# Patient Record
Sex: FEMALE | Race: BLACK OR AFRICAN AMERICAN | NOT HISPANIC OR LATINO | Employment: OTHER | URBAN - METROPOLITAN AREA
[De-identification: names, ages, dates, MRNs, and addresses within clinical notes are randomized per-mention and may not be internally consistent; named-entity substitution may affect disease eponyms.]

---

## 2018-04-25 PROBLEM — E55.9 VITAMIN D DEFICIENCY: Status: ACTIVE | Noted: 2018-04-25

## 2018-04-25 PROBLEM — E78.5 HYPERLIPIDEMIA: Status: ACTIVE | Noted: 2018-04-25

## 2018-04-25 PROBLEM — I10 ESSENTIAL HYPERTENSION, BENIGN: Status: ACTIVE | Noted: 2018-04-25

## 2018-04-25 PROBLEM — Z11.3 SCREENING EXAMINATION FOR VENEREAL DISEASE: Status: ACTIVE | Noted: 2018-04-25

## 2018-04-25 PROBLEM — R73.02 IGT (IMPAIRED GLUCOSE TOLERANCE): Status: ACTIVE | Noted: 2018-04-25

## 2018-04-25 PROBLEM — Z00.00 ANNUAL PHYSICAL EXAM: Status: ACTIVE | Noted: 2018-04-25

## 2018-04-25 PROBLEM — Z12.11 ENCOUNTER FOR SCREENING COLONOSCOPY: Status: ACTIVE | Noted: 2018-04-25

## 2018-04-25 PROBLEM — M25.511 ACUTE PAIN OF RIGHT SHOULDER: Status: ACTIVE | Noted: 2018-04-25

## 2018-04-27 ENCOUNTER — TELEPHONE (OUTPATIENT)
Dept: RADIOLOGY | Facility: HOSPITAL | Age: 63
End: 2018-04-27

## 2018-04-28 ENCOUNTER — HOSPITAL ENCOUNTER (OUTPATIENT)
Dept: RADIOLOGY | Facility: HOSPITAL | Age: 63
Discharge: HOME | End: 2018-04-28
Attending: INTERNAL MEDICINE
Payer: COMMERCIAL

## 2018-04-28 DIAGNOSIS — R93.89 ABNORMAL X-RAY: ICD-10-CM

## 2018-04-28 PROCEDURE — 73221 MRI JOINT UPR EXTREM W/O DYE: CPT | Mod: 26,RT,, | Performed by: RADIOLOGY

## 2018-04-28 PROCEDURE — 73221 MRI JOINT UPR EXTREM W/O DYE: CPT | Mod: TC,PO,RT

## 2018-05-10 ENCOUNTER — CLINICAL SUPPORT (OUTPATIENT)
Dept: REHABILITATION | Facility: HOSPITAL | Age: 63
End: 2018-05-10
Payer: COMMERCIAL

## 2018-05-10 DIAGNOSIS — M25.511 ACUTE PAIN OF RIGHT SHOULDER: Primary | ICD-10-CM

## 2018-05-10 PROCEDURE — 97014 ELECTRIC STIMULATION THERAPY: CPT | Mod: PO | Performed by: PHYSICAL THERAPIST

## 2018-05-10 PROCEDURE — 97110 THERAPEUTIC EXERCISES: CPT | Mod: PO | Performed by: PHYSICAL THERAPIST

## 2018-05-10 NOTE — PROGRESS NOTES
"    DATE OF INITIAL PHYSICAL THERAPY EVALUATION:             May 10, 2018      REFERRING PROVIDER:       Karine Peralta MD      REFERRING DIAGNOSIS:     Acute pain of right shoulder [M25.511]      ORDERS:   Evaluate and treat as indicated     VISIT    #1       SUBJECTIVE:      Patients primary complaint(s):  Right shoulder pain and stiffness with active elevation of the right UE against gravity  Impaired ADL's    History of present condition:    Patient complains of acute right shoulder pain and stiffness which has been present for approximately one month.  She reports her discomfort began after trying to push up from a right side lying position on the couch.  She states as she began to push herself up with her right UE, feel a "tearing" sensation over the top of the shoulder which was acutely painful.   Over the next few days her right shoulder became increasingly stiff and painful and she began to experience a decrease in functional ROM.  She complains of not being able to lift her right UE beyond 90 degrees flexion or abduction due to pain at the end point throughout the glenohumeral joint.  She reports she is pain free with her right shoulder in a neutral position and at rest.  There are no complaints of radiating discomfort into the right UE.      Pain Ratin/10 at rest      4/10 at the end point of active available range    Patient reports the following functional activity limitations:  Unable to reach overhead  Lifting and carrying impaired  ADL's impaired; sweeping, making the bed, etc    (FUNCTIONAL ASSESSMENT TOOL)    THE UPPER EXTREMITY FUNCTIONAL SCALE (UEFS) - The following scores are based on patient reported assessment at the time of the initial physical therapy evaluation:    0 = extreme difficulty or unable to perform activity; 1 = quite a bit of difficulty; 2 = moderate difficulty; 3 = a little bit of difficulty; 4 = no difficulty        Completed by patient on May 10, 37369    1 Any of " your usual work, housework, or school activities;   2  2 Your usual hobbies, re creational or sporting activities;   0  3 Lifting a bag of groceries to waist level;     3  4 Lifting a bag of groceries above your head;     0  5 Grooming your hair;        2  6 Pushing up on your hands (eg from bathtub or chair);   2  7 Preparing food (eg peeling, cutting);     2  8 Driving;         2  9 Vacuuming, sweeping or raking;      1  10 Dressing;         2  11 Doing up buttons;        4  12 Using tools or appliances;       4  13 Opening doors;        3  14 Cleaning;         2  15 Tying or lacing shoes;       4  16 Sleeping;         1  17 Laundering clothes (eg washing, ironing, folding);   2  18 Opening a jar;        2  19 Throwing a ball;        1  20 Carrying a small suitcase with your affected limb;   4  SCORE: 43/80    Patient reports 54% ability on the Upper Extremity Functional Scale.   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     Vitamin D deficiency      Patient Active Problem List   Diagnosis    Annual physical exam    Acute pain of right shoulder    Screening examination for venereal disease    Essential hypertension, benign    Vitamin D deficiency    Hyperlipidemia    IGT (impaired glucose tolerance)    Encounter for screening colonoscopy       Current Outpatient Prescriptions:     amLODIPine (NORVASC) 10 MG tablet, Take 1 tablet (10 mg total) by mouth once daily., Disp: 30 tablet, Rfl: 5    losartan-hydrochlorothiazide 50-12.5 mg (HYZAAR) 50-12.5 mg per tablet, TAKE 1 TABLET BY MOUTH DAILY, Disp: 30 tablet, Rfl: 0    olmesartan-hydrochlorothiazide (BENICAR HCT) 40-25 mg per tablet, TAKE 1 TABLET BY MOUTH DAILY. FOLLOW-UP APPOINTMENT., Disp: 30 tablet, Rfl: 5      Previous  physical therapy and treatments:  Patient has not participated in a physical therapy program to date for current referring diagnoses.    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    Swelling  No swelling noted in the UE's bilaterally    ROM of the right shoulder  Flexion  Active 90 degrees  Passive 150 degrees      End point of motion limited by complaints of pain    Extension WNL's  End point is uncomfortable    Abduction Active 90 degrees  Passive 90 degrees  End point of motion limited by complaints of pain    Adduction  WNL's    External rotation with shoulder at 30 degrees abduction: 45 degrees passive and active  End point is painful    Internal rotation with shoulder at 30 degrees abduction: 30 degrees passive and active  End point is painful    Patient has an acute positive impingement sign    Functional Strength Testing Right UE  Shoulder flexion 4+/5  Shoulder extension 4+/5  Shoulder adduction 5/5  Shoulder abduction 3+/5  Supraspinatus 3-/5  Infraspinatus 3-/5  Biceps 4+/5  Triceps 4+/5    Patient complained of pain with resistance to abduction, supraspinatus, and infraspinatus    Palpation  Right AC joint is tender  Tender over the insertion of the right rotator cuff  Tender in posterior deltoid    Neuromuscular Exam    Sensation  No sensory disturbances noted throughout the LE's  Patient did not complain of paresthesias.      PROBLEM LIST - ASSESSMENT  Patient presents with limited, painful ROM of the right shoulder, an acute positive impingement sign, and rotator cuff weakness.  Symptoms are impairing functional activities.  Patient would benefit from an out-patient physical therapy program to restore ROM, improve strength, and decrease joint and soft tissue pain.    Activity Limitations, Participation Restrictions, and CO-MORBIDITIES  which may impact the plan of care and potentially impede the patient's progress in therapy include:  none    The patient does not present with any learning or communication barriers which may impact the plan of care and potentially impede the patient's progress in therapy.      CLINICAL PRESENTATION:  Patient's Clinical Presentation is STABLE.      RECOMMENDED PLAN OF CARE:  Passive stretching exercises; joint mobilization to improve ROM of the right shoulder  Strengthening exercises to improve strength of the rotator cuff and scapular stabilizers    Modalities to decrease soft tissue tenderness and joint pain and stiffness      TREATMENT PROVIDED:       (one on one with therapist for therapeutic exercises 15 Mins)  Passive stretching of the right shoulder by therapist in all planes  Patient to continue with stretching exercises at home  --wand assisted shoulder flexion and external rotation in supine  --wand assisted shoulder extension and internal rotation in standing     She was instructed in the proper use of heat at home for symptomatic relief.      PLAN: Patient to attend out-patient physical therapy 2 x week to continue the Recommended Plan of Care.                                                                 Will add heat and electrical stimulation next visit; continue stretching exercises; begin mobility and strengthening exercises.        SHORT TERM GOALS:    1. Patient will report compliance with daily stretching exercises  2. Patient will report 2/10 or less pain level at end point of range   3. Active shoulder elevation will improve to 140 degrees or greater    LONG TERM GOALS:  1. Full, pain free ROM of the right shoulder  2. Strength of the rotator cuff of right shoulder will improve to 4+/5  3. Patient will report a 20% or greater decrease in functional impairment      These services are reasonable and necessary for the conditions set forth above while under my care.   '

## 2018-05-21 ENCOUNTER — CLINICAL SUPPORT (OUTPATIENT)
Dept: REHABILITATION | Facility: HOSPITAL | Age: 63
End: 2018-05-21
Payer: COMMERCIAL

## 2018-05-21 DIAGNOSIS — M25.511 ACUTE PAIN OF RIGHT SHOULDER: Primary | ICD-10-CM

## 2018-05-21 PROCEDURE — 97110 THERAPEUTIC EXERCISES: CPT | Mod: PO | Performed by: PHYSICAL THERAPIST

## 2018-05-21 PROCEDURE — 97014 ELECTRIC STIMULATION THERAPY: CPT | Mod: PO | Performed by: PHYSICAL THERAPIST

## 2018-05-22 NOTE — PROGRESS NOTES
"    DATE OF INITIAL PHYSICAL THERAPY EVALUATION:             May 10, 2018      REFERRING PROVIDER:       Karine Peralta MD      REFERRING DIAGNOSIS:     Acute pain of right shoulder [M25.511]      ORDERS:   Evaluate and treat as indicated     VISIT    #2      SUBJECTIVE:  Patient states she is slightly less symptomatic overall.  She reports working on the stretching exercises daily as prescribed.    Patients primary complaint(s):  Right shoulder pain and stiffness with active elevation of the right UE against gravity  Impaired ADL's    History of present condition provided by the patient during the initial evaluation:  Patient complains of acute right shoulder pain and stiffness which has been present for approximately one month.  She reports her discomfort began after trying to push up from a right side lying position on the couch.  She states as she began to push herself up with her right UE, feel a "tearing" sensation over the top of the shoulder which was acutely painful.   Over the next few days her right shoulder became increasingly stiff and painful and she began to experience a decrease in functional ROM.  She complains of not being able to lift her right UE beyond 90 degrees flexion or abduction due to pain at the end point throughout the glenohumeral joint.  She reports she is pain free with her right shoulder in a neutral position and at rest.  There are no complaints of radiating discomfort into the right UE.      Pain Ratin/10 at rest      4/10 at the end point of active available range    Patient reports the following functional activity limitations:  Unable to reach overhead  Lifting and carrying impaired  ADL's impaired; sweeping, making the bed, etc    (FUNCTIONAL ASSESSMENT TOOL)    THE UPPER EXTREMITY FUNCTIONAL SCALE (UEFS) - The following scores are based on patient reported assessment at the time of the initial physical therapy evaluation:    0 = extreme difficulty or unable to " perform activity; 1 = quite a bit of difficulty; 2 = moderate difficulty; 3 = a little bit of difficulty; 4 = no difficulty        Completed by patient on May 10, 79446    1 Any of your usual work, housework, or school activities;   2  2 Your usual hobbies, re creational or sporting activities;   0  3 Lifting a bag of groceries to waist level;     3  4 Lifting a bag of groceries above your head;     0  5 Grooming your hair;        2  6 Pushing up on your hands (eg from bathtub or chair);   2  7 Preparing food (eg peeling, cutting);     2  8 Driving;         2  9 Vacuuming, sweeping or raking;      1  10 Dressing;         2  11 Doing up buttons;        4  12 Using tools or appliances;       4  13 Opening doors;        3  14 Cleaning;         2  15 Tying or lacing shoes;       4  16 Sleeping;         1  17 Laundering clothes (eg washing, ironing, folding);   2  18 Opening a jar;        2  19 Throwing a ball;        1  20 Carrying a small suitcase with your affected limb;   4  SCORE: 43/80    Patient reports 54% ability on the Upper Extremity Functional Scale.   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     Vitamin D deficiency      Patient Active Problem List   Diagnosis    Annual physical exam    Acute pain of right shoulder    Screening examination for venereal disease    Essential hypertension, benign    Vitamin D deficiency    Hyperlipidemia    IGT (impaired glucose tolerance)    Encounter for screening colonoscopy       Current Outpatient Prescriptions:     amLODIPine (NORVASC) 10 MG tablet, Take 1 tablet (10 mg total) by mouth once daily., Disp: 30 tablet, Rfl: 5    losartan-hydrochlorothiazide 50-12.5 mg (HYZAAR) 50-12.5 mg per tablet, TAKE 1 TABLET BY MOUTH DAILY, Disp: 30  tablet, Rfl: 0    olmesartan-hydrochlorothiazide (BENICAR HCT) 40-25 mg per tablet, TAKE 1 TABLET BY MOUTH DAILY. FOLLOW-UP APPOINTMENT., Disp: 30 tablet, Rfl: 5      Previous physical therapy and treatments:  Patient has not participated in a physical therapy program to date for current referring diagnoses.    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    Swelling  No swelling noted in the UE's bilaterally    ROM of the right shoulder (initial)  Flexion  Active 90 degrees  Passive 150 degrees         Today:  Active flexion 100 degrees    Passive Flexion 160 degrees  End point of motion limited by complaints of pain    Extension WNL's  End point is uncomfortable    Abduction Active 90 degrees  Passive 90 degrees  End point of motion limited by complaints of pain    Adduction  WNL's    External rotation with shoulder at 30 degrees abduction: 45 degrees passive and active  End point is painful    Internal rotation with shoulder at 30 degrees abduction: 30 degrees passive and active  End point is painful    Patient has an acute positive impingement sign    Functional Strength Testing Right UE  Shoulder flexion 4+/5  Shoulder extension 4+/5  Shoulder adduction 5/5  Shoulder abduction 3+/5  Supraspinatus 3-/5  Infraspinatus 3-/5  Biceps 4+/5  Triceps 4+/5    Patient complained of pain with resistance to abduction, supraspinatus, and infraspinatus    Palpation  Right AC joint is tender  Tender over the insertion of the right rotator cuff  Tender in posterior deltoid  Tender over the long head of the biceps    Neuromuscular Exam    Sensation  No sensory disturbances noted throughout the LE's  Patient did not complain of paresthesias.      PROBLEM LIST - ASSESSMENT  Patient presents with limited, painful ROM of the right shoulder, an  acute positive impingement sign, and rotator cuff weakness.    Symptoms are impairing functional activities.  Patient would benefit from continuing an out-patient physical therapy program to restore ROM, improve strength, and decrease joint and soft tissue pain.    Activity Limitations, Participation Restrictions, and CO-MORBIDITIES which may impact the plan of care and potentially impede the patient's progress in therapy include:  none    The patient does not present with any learning or communication barriers which may impact the plan of care and potentially impede the patient's progress in therapy.      CLINICAL PRESENTATION:  Patient's Clinical Presentation is STABLE.      RECOMMENDED PLAN OF CARE:  Passive stretching exercises; joint mobilization to improve ROM of the right shoulder  Strengthening exercises to improve strength of the rotator cuff and scapular stabilizers    Modalities to decrease soft tissue tenderness and joint pain and stiffness      TREATMENT PROVIDED:       (one on one with therapist for therapeutic exercises 30 Mins)  -passive stretching of the right shoulder by therapist in all planes  -joint mobilization of the glenohumeral and AC joints  -rotator cuff strengthening with 3 lb dumbbell  -strengthening for the shoulder girdle musculature with 3 lb dumbbell; flexion, abduction, supraspinatus  -resisted scapular retraction on the [employer]; level 3  -resisted chest press on the [employer]; level 3  -upper extremity ergometer for ROM     Other treatment included moist heat and electrical stimulation x 20 mins applied to the right shoulder.    Patient to continue with stretching exercises at home  --wand assisted shoulder flexion and external rotation in supine  --wand assisted shoulder extension and internal rotation in standing     She has been instructed in the proper use of heat at home for symptomatic relief.      PLAN: Patient to attend out-patient physical therapy 2 x week to  continue the Recommended Plan of Care.                                                                       SHORT TERM GOALS:    1. Patient will report compliance with daily stretching exercises  2. Patient will report 2/10 or less pain level at end point of range   3. Active shoulder elevation will improve to 140 degrees or greater    LONG TERM GOALS:  1. Full, pain free ROM of the right shoulder  2. Strength of the rotator cuff of right shoulder will improve to 4+/5  3. Patient will report a 20% or greater decrease in functional impairment      These services are reasonable and necessary for the conditions set forth above while under my care.   '

## 2018-05-25 ENCOUNTER — CLINICAL SUPPORT (OUTPATIENT)
Dept: REHABILITATION | Facility: HOSPITAL | Age: 63
End: 2018-05-25
Payer: COMMERCIAL

## 2018-05-25 DIAGNOSIS — M25.511 ACUTE PAIN OF RIGHT SHOULDER: Primary | ICD-10-CM

## 2018-05-25 PROCEDURE — 97014 ELECTRIC STIMULATION THERAPY: CPT | Mod: PO | Performed by: PHYSICAL THERAPIST

## 2018-05-25 PROCEDURE — 97110 THERAPEUTIC EXERCISES: CPT | Mod: PO | Performed by: PHYSICAL THERAPIST

## 2018-05-25 NOTE — PROGRESS NOTES
"    DATE OF INITIAL PHYSICAL THERAPY EVALUATION:             May 10, 2018      REFERRING PROVIDER:       Karine Peralta MD      REFERRING DIAGNOSIS:     Acute pain of right shoulder [M25.511]      ORDERS:   Evaluate and treat as indicated     VISIT    #3      SUBJECTIVE:  Patient states the passive stretching of her right shoulder by the therapist was helpful.   She complains of limited ROM and pain at the end point into active elevation.    Patients primary complaint(s):  Right shoulder pain and stiffness with active elevation of the right UE against gravity  Impaired ADL's    History of present condition provided by the patient during the initial evaluation:  Patient complains of acute right shoulder pain and stiffness which has been present for approximately one month.  She reports her discomfort began after trying to push up from a right side lying position on the couch.  She states as she began to push herself up with her right UE, feel a "tearing" sensation over the top of the shoulder which was acutely painful.   Over the next few days her right shoulder became increasingly stiff and painful and she began to experience a decrease in functional ROM.  She complains of not being able to lift her right UE beyond 90 degrees flexion or abduction due to pain at the end point throughout the glenohumeral joint.  She reports she is pain free with her right shoulder in a neutral position and at rest.  There are no complaints of radiating discomfort into the right UE.      Pain Ratin/10 at rest      4/10 at the end point of active available range    Patient reports the following functional activity limitations:  Unable to reach overhead  Lifting and carrying impaired  ADL's impaired; sweeping, making the bed, etc    (FUNCTIONAL ASSESSMENT TOOL)    THE UPPER EXTREMITY FUNCTIONAL SCALE (UEFS) - The following scores are based on patient reported assessment at the time of the initial physical therapy " evaluation:    0 = extreme difficulty or unable to perform activity; 1 = quite a bit of difficulty; 2 = moderate difficulty; 3 = a little bit of difficulty; 4 = no difficulty        Completed by patient on May 10, [address]4486    1 Any of your usual work, housework, or school activities;   2  2 Your usual hobbies, re creational or sporting activities;   0  3 Lifting a bag of groceries to waist level;     3  4 Lifting a bag of groceries above your head;     0  5 Grooming your hair;        2  6 Pushing up on your hands (eg from bathtub or chair);   2  7 Preparing food (eg peeling, cutting);     2  8 Driving;         2  9 Vacuuming, sweeping or raking;      1  10 Dressing;         2  11 Doing up buttons;        4  12 Using tools or appliances;       4  13 Opening doors;        3  14 Cleaning;         2  15 Tying or lacing shoes;       4  16 Sleeping;         1  17 Laundering clothes (eg washing, ironing, folding);   2  18 Opening a jar;        2  19 Throwing a ball;        1  20 Carrying a small suitcase with your affected limb;   4  SCORE: 43/80    Patient reports 54% ability on the Upper Extremity Functional Scale.   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     Vitamin D deficiency      Patient Active Problem List   Diagnosis    Annual physical exam    Acute pain of right shoulder    Screening examination for venereal disease    Essential hypertension, benign    Vitamin D deficiency    Hyperlipidemia    IGT (impaired glucose tolerance)    Encounter for screening colonoscopy       Current Outpatient Prescriptions:     amLODIPine (NORVASC) 10 MG tablet, Take 1 tablet (10 mg total) by mouth once daily., Disp: 30 tablet, Rfl: 5    losartan-hydrochlorothiazide 50-12.5 mg (HYZAAR) 50-12.5 mg  per tablet, TAKE 1 TABLET BY MOUTH DAILY, Disp: 30 tablet, Rfl: 0    olmesartan-hydrochlorothiazide (BENICAR HCT) 40-25 mg per tablet, TAKE 1 TABLET BY MOUTH DAILY. FOLLOW-UP APPOINTMENT., Disp: 30 tablet, Rfl: 5      Previous physical therapy and treatments:  Patient has not participated in a physical therapy program to date for current referring diagnoses.    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    Swelling  No swelling noted in the UE's bilaterally    ROM of the right shoulder (initial)  Flexion  Active 90 degrees  Passive 150 degrees         Today:  Active flexion 100 degrees    Passive Flexion 160 degrees  End point of motion limited by complaints of pain    Extension WNL's  End point is uncomfortable    Abduction Active 90 degrees  Passive 90 degrees  End point of motion limited by complaints of pain    Adduction  WNL's    External rotation with shoulder at 30 degrees abduction: 45 degrees passive and active  End point is painful    Internal rotation with shoulder at 30 degrees abduction: 30 degrees passive and active  End point is painful    Patient has an acute positive impingement sign    Functional Strength Testing Right UE  Shoulder flexion 4+/5  Shoulder extension 4+/5  Shoulder adduction 5/5  Shoulder abduction 3+/5  Supraspinatus 3-/5  Infraspinatus 3-/5  Biceps 4+/5  Triceps 4+/5    Patient complained of pain with resistance to abduction, supraspinatus, and infraspinatus    Palpation  Right AC joint is tender  Tender over the insertion of the right rotator cuff  Tender in posterior deltoid  Tender over the long head of the biceps    Neuromuscular Exam    Sensation  No sensory disturbances noted throughout the LE's  Patient did not complain of paresthesias.      PROBLEM LIST - ASSESSMENT  Patient presents  with limited, painful ROM of the right shoulder, an acute positive impingement sign, and rotator cuff weakness.    Symptoms are impairing functional activities.  Patient would benefit from continuing an out-patient physical therapy program to restore ROM, improve strength, and decrease joint and soft tissue pain.    Activity Limitations, Participation Restrictions, and CO-MORBIDITIES which may impact the plan of care and potentially impede the patient's progress in therapy include:  none    The patient does not present with any learning or communication barriers which may impact the plan of care and potentially impede the patient's progress in therapy.      CLINICAL PRESENTATION:  Patient's Clinical Presentation is STABLE.      RECOMMENDED PLAN OF CARE:  Passive stretching exercises; joint mobilization to improve ROM of the right shoulder  Strengthening exercises to improve strength of the rotator cuff and scapular stabilizers    Modalities to decrease soft tissue tenderness and joint pain and stiffness      TREATMENT PROVIDED:       (one on one with therapist for therapeutic exercises 25 Mins)  -passive stretching of the right shoulder by therapist in all planes  -joint mobilization of the glenohumeral and AC joints  -rotator cuff strengthening; guided resisted internal and external rotation with the shoulder abducted to 30 degrees  -strengthening for the shoulder girdle musculature with 3 lb dumbbell; flexion, abduction, supraspinatus - deferred  -resisted scapular retraction on the Hydrafitness; level 3  -resisted chest press on the Hydrafitness; level 3  -upper extremity ergometer for ROM     Other treatment included moist heat and electrical stimulation x 20 mins applied to the right shoulder.    Patient to continue with stretching exercises at home  --wand assisted shoulder flexion and external rotation in supine  --wand assisted shoulder extension and internal rotation in standing     She has been instructed  in the proper use of heat at home for symptomatic relief.      PLAN: Patient to attend out-patient physical therapy 2 x week to continue the Recommended Plan of Care.                                                                       SHORT TERM GOALS:    1. Patient will report compliance with daily stretching exercises  2. Patient will report 2/10 or less pain level at end point of range   3. Active shoulder elevation will improve to 140 degrees or greater    LONG TERM GOALS:  1. Full, pain free ROM of the right shoulder  2. Strength of the rotator cuff of right shoulder will improve to 4+/5  3. Patient will report a 20% or greater decrease in functional impairment      These services are reasonable and necessary for the conditions set forth above while under my care.   '

## 2018-05-28 ENCOUNTER — CLINICAL SUPPORT (OUTPATIENT)
Dept: REHABILITATION | Facility: HOSPITAL | Age: 63
End: 2018-05-28
Payer: COMMERCIAL

## 2018-05-28 DIAGNOSIS — M25.511 ACUTE PAIN OF RIGHT SHOULDER: Primary | ICD-10-CM

## 2018-05-28 PROCEDURE — 97014 ELECTRIC STIMULATION THERAPY: CPT | Mod: PO | Performed by: PHYSICAL THERAPIST

## 2018-05-28 PROCEDURE — 97110 THERAPEUTIC EXERCISES: CPT | Mod: PO | Performed by: PHYSICAL THERAPIST

## 2018-05-28 NOTE — PROGRESS NOTES
"    DATE OF INITIAL PHYSICAL THERAPY EVALUATION:             May 10, 2018      REFERRING PROVIDER:       Karine Peralta MD      REFERRING DIAGNOSIS:     Acute pain of right shoulder [M25.511]      ORDERS:   Evaluate and treat as indicated     VISIT    #4      SUBJECTIVE:  Patient states she experienced rebound soreness from the stretching and strengthening exercises she completed during her last therapy visit.   She complains of limited ROM and pain at the end point into active elevation.    Patients primary complaint(s):  Right shoulder pain and stiffness with active elevation of the right UE against gravity  Impaired ADL's    History of present condition provided by the patient during the initial evaluation:  Patient complains of acute right shoulder pain and stiffness which has been present for approximately one month.  She reports her discomfort began after trying to push up from a right side lying position on the couch.  She states as she began to push herself up with her right UE, feel a "tearing" sensation over the top of the shoulder which was acutely painful.   Over the next few days her right shoulder became increasingly stiff and painful and she began to experience a decrease in functional ROM.  She complains of not being able to lift her right UE beyond 90 degrees flexion or abduction due to pain at the end point throughout the glenohumeral joint.  She reports she is pain free with her right shoulder in a neutral position and at rest.  There are no complaints of radiating discomfort into the right UE.      Pain Ratin/10 at rest      4/10 at the end point of active available range    Patient reports the following functional activity limitations:  Unable to reach overhead  Lifting and carrying impaired  ADL's impaired; sweeping, making the bed, etc    (FUNCTIONAL ASSESSMENT TOOL)    THE UPPER EXTREMITY FUNCTIONAL SCALE (UEFS) - The following scores are based on patient reported assessment at " the time of the initial physical therapy evaluation:    0 = extreme difficulty or unable to perform activity; 1 = quite a bit of difficulty; 2 = moderate difficulty; 3 = a little bit of difficulty; 4 = no difficulty        Completed by patient on May 10, 20153    1 Any of your usual work, housework, or school activities;   2  2 Your usual hobbies, re creational or sporting activities;   0  3 Lifting a bag of groceries to waist level;     3  4 Lifting a bag of groceries above your head;     0  5 Grooming your hair;        2  6 Pushing up on your hands (eg from bathtub or chair);   2  7 Preparing food (eg peeling, cutting);     2  8 Driving;         2  9 Vacuuming, sweeping or raking;      1  10 Dressing;         2  11 Doing up buttons;        4  12 Using tools or appliances;       4  13 Opening doors;        3  14 Cleaning;         2  15 Tying or lacing shoes;       4  16 Sleeping;         1  17 Laundering clothes (eg washing, ironing, folding);   2  18 Opening a jar;        2  19 Throwing a ball;        1  20 Carrying a small suitcase with your affected limb;   4  SCORE: 43/80    Patient reports 54% ability on the Upper Extremity Functional Scale.   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     Vitamin D deficiency      Patient Active Problem List   Diagnosis    Annual physical exam    Acute pain of right shoulder    Screening examination for venereal disease    Essential hypertension, benign    Vitamin D deficiency    Hyperlipidemia    IGT (impaired glucose tolerance)    Encounter for screening colonoscopy       Current Outpatient Prescriptions:     amLODIPine (NORVASC) 10 MG tablet, Take 1 tablet (10 mg total) by mouth once daily., Disp: 30 tablet, Rfl: 5     losartan-hydrochlorothiazide 50-12.5 mg (HYZAAR) 50-12.5 mg per tablet, TAKE 1 TABLET BY MOUTH DAILY, Disp: 30 tablet, Rfl: 0    olmesartan-hydrochlorothiazide (BENICAR HCT) 40-25 mg per tablet, TAKE 1 TABLET BY MOUTH DAILY. FOLLOW-UP APPOINTMENT., Disp: 30 tablet, Rfl: 5      Previous physical therapy and treatments:  Patient has not participated in a physical therapy program to date for current referring diagnoses.    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    Swelling  No swelling noted in the UE's bilaterally    ROM of the right shoulder (initial)  Flexion  Active 90 degrees  Passive 150 degrees         Today:  Active flexion 100 degrees    Passive Flexion 160 degrees  End point of motion limited by complaints of pain    Extension WNL's  End point is uncomfortable    Abduction Active 90 degrees  Passive 90 degrees  End point of motion limited by complaints of pain    Adduction  WNL's    External rotation with shoulder at 30 degrees abduction: 45 degrees passive and active  End point is painful    Internal rotation with shoulder at 30 degrees abduction: 30 degrees passive and active  End point is painful    Patient has an acute positive impingement sign    Functional Strength Testing Right UE  Shoulder flexion 4+/5  Shoulder extension 4+/5  Shoulder adduction 5/5  Shoulder abduction 3+/5  Supraspinatus 3-/5  Infraspinatus 3-/5  Biceps 4+/5  Triceps 4+/5    Patient complained of pain with resistance to abduction, supraspinatus, and infraspinatus    Palpation  Right AC joint is tender  Tender over the insertion of the right rotator cuff  Tender in posterior deltoid  Tender over the long head of the biceps    Neuromuscular Exam    Sensation  No sensory disturbances noted throughout the LE's  Patient did not complain of  paresthesias.      PROBLEM LIST - ASSESSMENT  Patient presents with limited, painful ROM of the right shoulder, an acute positive impingement sign, and rotator cuff weakness.    Symptoms are impairing functional activities.  Patient would benefit from continuing an out-patient physical therapy program to restore ROM, improve strength, and decrease joint and soft tissue pain.    Activity Limitations, Participation Restrictions, and CO-MORBIDITIES which may impact the plan of care and potentially impede the patient's progress in therapy include:  none    The patient does not present with any learning or communication barriers which may impact the plan of care and potentially impede the patient's progress in therapy.      CLINICAL PRESENTATION:  Patient's Clinical Presentation is STABLE.      RECOMMENDED PLAN OF CARE:  Passive stretching exercises; joint mobilization to improve ROM of the right shoulder  Strengthening exercises to improve strength of the rotator cuff and scapular stabilizers    Modalities to decrease soft tissue tenderness and joint pain and stiffness      TREATMENT PROVIDED:       (one on one with therapist for therapeutic exercises 25 Mins)  -passive stretching of the right shoulder by therapist into all planes of motion  -joint mobilization of the glenohumeral and AC joints  -rotator cuff strengthening; guided resisted internal and external rotation with the shoulder abducted to 30 degrees; 3 lb dumbbell  -strengthening for the shoulder girdle musculature with 2 lb dumbbell; flexion, abduction, supraspinatus   -resisted scapular retraction on the Hydrafitness; level 3 - deferred  -resisted chest press on the Hydrafitness; level 3 - deferred  -upper extremity ergometer for ROM     Other treatment included moist heat and electrical stimulation x 20 mins applied to the right shoulder.    Patient to continue with stretching exercises at home  --wand assisted shoulder flexion and external rotation in  supine  --wand assisted shoulder extension and internal rotation in standing     She has been instructed in the proper use of heat at home for symptomatic relief.      PLAN: Patient to attend out-patient physical therapy 2 x week to continue the Recommended Plan of Care.                                                                       SHORT TERM GOALS:    1. Patient will report compliance with daily stretching exercises  2. Patient will report 2/10 or less pain level at end point of range   3. Active shoulder elevation will improve to 140 degrees or greater    LONG TERM GOALS:  1. Full, pain free ROM of the right shoulder  2. Strength of the rotator cuff of right shoulder will improve to 4+/5  3. Patient will report a 20% or greater decrease in functional impairment      These services are reasonable and necessary for the conditions set forth above while under my care.   '

## 2018-06-04 ENCOUNTER — CLINICAL SUPPORT (OUTPATIENT)
Dept: REHABILITATION | Facility: HOSPITAL | Age: 63
End: 2018-06-04
Payer: COMMERCIAL

## 2018-06-04 DIAGNOSIS — M25.511 ACUTE PAIN OF RIGHT SHOULDER: Primary | ICD-10-CM

## 2018-06-04 PROCEDURE — 97014 ELECTRIC STIMULATION THERAPY: CPT | Mod: PO | Performed by: PHYSICAL THERAPIST

## 2018-06-04 PROCEDURE — 97110 THERAPEUTIC EXERCISES: CPT | Mod: PO | Performed by: PHYSICAL THERAPIST

## 2018-06-05 NOTE — PROGRESS NOTES
"    DATE OF INITIAL PHYSICAL THERAPY EVALUATION:             May 10, 2018      REFERRING PROVIDER:       Karine Peralta MD      REFERRING DIAGNOSIS:     Acute pain of right shoulder [M25.511]      ORDERS:   Evaluate and treat as indicated     VISIT    #5      SUBJECTIVE:  Patient is complaining of right upper arm pain and tightness with right shoulder elevation to 100 degrees.  She reports the pain continues to impair her functional ability.      Patients primary complaint(s):  Right shoulder pain and stiffness with active elevation of the right UE against gravity  Impaired ADL's    History of present condition provided by the patient during the initial evaluation:  Patient complains of acute right shoulder pain and stiffness which has been present for approximately one month.  She reports her discomfort began after trying to push up from a right side lying position on the couch.  She states as she began to push herself up with her right UE, feel a "tearing" sensation over the top of the shoulder which was acutely painful.   Over the next few days her right shoulder became increasingly stiff and painful and she began to experience a decrease in functional ROM.  She complains of not being able to lift her right UE beyond 90 degrees flexion or abduction due to pain at the end point throughout the glenohumeral joint.  She reports she is pain free with her right shoulder in a neutral position and at rest.  There are no complaints of radiating discomfort into the right UE.      Pain Ratin/10 at rest      4/10 at the end point of active available range    Patient reports the following functional activity limitations:  Unable to reach overhead  Lifting and carrying impaired  ADL's impaired; sweeping, making the bed, etc    (FUNCTIONAL ASSESSMENT TOOL)    THE UPPER EXTREMITY FUNCTIONAL SCALE (UEFS) - The following scores are based on patient reported assessment at the time of the initial physical therapy " evaluation:    0 = extreme difficulty or unable to perform activity; 1 = quite a bit of difficulty; 2 = moderate difficulty; 3 = a little bit of difficulty; 4 = no difficulty        Completed by patient on May 10, 46853    1 Any of your usual work, housework, or school activities;   2  2 Your usual hobbies, re creational or sporting activities;   0  3 Lifting a bag of groceries to waist level;     3  4 Lifting a bag of groceries above your head;     0  5 Grooming your hair;        2  6 Pushing up on your hands (eg from bathtub or chair);   2  7 Preparing food (eg peeling, cutting);     2  8 Driving;         2  9 Vacuuming, sweeping or raking;      1  10 Dressing;         2  11 Doing up buttons;        4  12 Using tools or appliances;       4  13 Opening doors;        3  14 Cleaning;         2  15 Tying or lacing shoes;       4  16 Sleeping;         1  17 Laundering clothes (eg washing, ironing, folding);   2  18 Opening a jar;        2  19 Throwing a ball;        1  20 Carrying a small suitcase with your affected limb;   4  SCORE: 43/80    Patient reports 54% ability on the Upper Extremity Functional Scale.   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     Vitamin D deficiency      Patient Active Problem List   Diagnosis    Annual physical exam    Acute pain of right shoulder    Screening examination for venereal disease    Essential hypertension, benign    Vitamin D deficiency    Hyperlipidemia    IGT (impaired glucose tolerance)    Encounter for screening colonoscopy       Current Outpatient Prescriptions:     amLODIPine (NORVASC) 10 MG tablet, Take 1 tablet (10 mg total) by mouth once daily., Disp: 30 tablet, Rfl: 5    losartan-hydrochlorothiazide 50-12.5 mg (HYZAAR) 50-12.5 mg  per tablet, TAKE 1 TABLET BY MOUTH DAILY, Disp: 30 tablet, Rfl: 0    olmesartan-hydrochlorothiazide (BENICAR HCT) 40-25 mg per tablet, TAKE 1 TABLET BY MOUTH DAILY. FOLLOW-UP APPOINTMENT., Disp: 30 tablet, Rfl: 5      Previous physical therapy and treatments:  Patient has not participated in a physical therapy program to date for current referring diagnoses.    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    Swelling  No swelling noted in the UE's bilaterally    ROM of the right shoulder (initial)  Flexion  Active 90 degrees  Passive 150 degrees         Today:  Active flexion 100 degrees    Passive Flexion 160 degrees  End point of motion limited by complaints of pain    Extension WNL's  End point is uncomfortable    Abduction Active 90 degrees  Passive 90 degrees  End point of motion limited by complaints of pain    Adduction  WNL's    External rotation with shoulder at 30 degrees abduction: 45 degrees passive and active  End point is painful    Internal rotation with shoulder at 30 degrees abduction: 30 degrees passive and active  End point is painful    Patient has an acute positive impingement sign    Functional Strength Testing Right UE  Shoulder flexion 4+/5  Shoulder extension 4+/5  Shoulder adduction 5/5  Shoulder abduction 3+/5  Supraspinatus 3-/5  Infraspinatus 3-/5  Biceps 4+/5  Triceps 4+/5    Patient complained of pain with resistance to abduction, supraspinatus, and infraspinatus    Palpation  Right AC joint is tender  Tender over the insertion of the right rotator cuff  Tender in posterior deltoid  Tender over the long head of the biceps    Neuromuscular Exam    Sensation  No sensory disturbances noted throughout the LE's  Patient did not complain of paresthesias.      PROBLEM LIST - ASSESSMENT  Patient presents  with limited, painful ROM of the right shoulder, an acute positive impingement sign, and rotator cuff weakness.    Symptoms are impairing functional activities.  Patient would benefit from continuing an out-patient physical therapy program to restore ROM, improve strength, and decrease joint and soft tissue pain.    Activity Limitations, Participation Restrictions, and CO-MORBIDITIES which may impact the plan of care and potentially impede the patient's progress in therapy include:  none    The patient does not present with any learning or communication barriers which may impact the plan of care and potentially impede the patient's progress in therapy.      CLINICAL PRESENTATION:  Patient's Clinical Presentation is STABLE.      RECOMMENDED PLAN OF CARE:  Passive stretching exercises; joint mobilization to improve ROM of the right shoulder  Strengthening exercises to improve strength of the rotator cuff and scapular stabilizers    Modalities to decrease soft tissue tenderness and joint pain and stiffness      TREATMENT PROVIDED:       (one on one with therapist for therapeutic exercises 25 Mins)  -passive stretching of the right shoulder by therapist into all planes of motion  -joint mobilization of the glenohumeral and AC joints  -rotator cuff strengthening; guided resisted internal and external rotation with the shoulder abducted to 30 degrees; 3 lb dumbbell  -strengthening for the shoulder girdle musculature with 2 lb dumbbell; flexion, abduction, supraspinatus   -resisted scapular retraction on the Hydrafitness; level 3   -resisted chest press on the Hydrafitness; level 3   -upper extremity ergometer for ROM   -assisted passive stretching on pulley system    Other treatment included moist heat and electrical stimulation x 20 mins applied to the right shoulder.    Patient to continue with stretching exercises at home  --wand assisted shoulder flexion and external rotation in supine  --wand assisted shoulder  extension and internal rotation in standing     She has been instructed in the proper use of heat at home for symptomatic relief.      PLAN: Patient to attend out-patient physical therapy 2 x week to continue the Recommended Plan of Care.                                                                       SHORT TERM GOALS:    1. Patient will report compliance with daily stretching exercises  2. Patient will report 2/10 or less pain level at end point of range   3. Active shoulder elevation will improve to 140 degrees or greater    LONG TERM GOALS:  1. Full, pain free ROM of the right shoulder  2. Strength of the rotator cuff of right shoulder will improve to 4+/5  3. Patient will report a 20% or greater decrease in functional impairment      These services are reasonable and necessary for the conditions set forth above while under my care.   '

## 2018-06-07 ENCOUNTER — CLINICAL SUPPORT (OUTPATIENT)
Dept: REHABILITATION | Facility: HOSPITAL | Age: 63
End: 2018-06-07
Payer: COMMERCIAL

## 2018-06-07 DIAGNOSIS — M25.511 ACUTE PAIN OF RIGHT SHOULDER: Primary | ICD-10-CM

## 2018-06-07 PROCEDURE — 97150 GROUP THERAPEUTIC PROCEDURES: CPT | Mod: PO | Performed by: PHYSICAL THERAPIST

## 2018-06-07 PROCEDURE — 97014 ELECTRIC STIMULATION THERAPY: CPT | Mod: PO | Performed by: PHYSICAL THERAPIST

## 2018-06-08 NOTE — PROGRESS NOTES
"    DATE OF INITIAL PHYSICAL THERAPY EVALUATION:             May 10, 2018      REFERRING PROVIDER:       Karine Peralta MD      REFERRING DIAGNOSIS:     Acute pain of right shoulder [M25.511]      ORDERS:   Evaluate and treat as indicated     VISIT    #6      SUBJECTIVE:  Patient continues to complain of right upper arm pain and tightness with right shoulder elevation to 100 degrees.      Patients primary complaint(s):  Right shoulder pain and stiffness with active elevation of the right UE against gravity  Impaired ADL's    History of present condition provided by the patient during the initial evaluation:  Patient complains of acute right shoulder pain and stiffness which has been present for approximately one month.  She reports her discomfort began after trying to push up from a right side lying position on the couch.  She states as she began to push herself up with her right UE, feel a "tearing" sensation over the top of the shoulder which was acutely painful.   Over the next few days her right shoulder became increasingly stiff and painful and she began to experience a decrease in functional ROM.  She complains of not being able to lift her right UE beyond 90 degrees flexion or abduction due to pain at the end point throughout the glenohumeral joint.  She reports she is pain free with her right shoulder in a neutral position and at rest.  There are no complaints of radiating discomfort into the right UE.      Pain Ratin/10 at rest      4/10 at the end point of active available range    Patient reports the following functional activity limitations:  Unable to reach overhead  Lifting and carrying impaired  ADL's impaired; sweeping, making the bed, etc    (FUNCTIONAL ASSESSMENT TOOL)    THE UPPER EXTREMITY FUNCTIONAL SCALE (UEFS) - The following scores are based on patient reported assessment at the time of the initial physical therapy evaluation:    0 = extreme difficulty or unable to perform " activity; 1 = quite a bit of difficulty; 2 = moderate difficulty; 3 = a little bit of difficulty; 4 = no difficulty        Completed by patient on May 10, 34481    1 Any of your usual work, housework, or school activities;   2  2 Your usual hobbies, re creational or sporting activities;   0  3 Lifting a bag of groceries to waist level;     3  4 Lifting a bag of groceries above your head;     0  5 Grooming your hair;        2  6 Pushing up on your hands (eg from bathtub or chair);   2  7 Preparing food (eg peeling, cutting);     2  8 Driving;         2  9 Vacuuming, sweeping or raking;      1  10 Dressing;         2  11 Doing up buttons;        4  12 Using tools or appliances;       4  13 Opening doors;        3  14 Cleaning;         2  15 Tying or lacing shoes;       4  16 Sleeping;         1  17 Laundering clothes (eg washing, ironing, folding);   2  18 Opening a jar;        2  19 Throwing a ball;        1  20 Carrying a small suitcase with your affected limb;   4  SCORE: 43/80    Patient reports 54% ability on the Upper Extremity Functional Scale.   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     Vitamin D deficiency      Patient Active Problem List   Diagnosis    Annual physical exam    Acute pain of right shoulder    Screening examination for venereal disease    Essential hypertension, benign    Vitamin D deficiency    Hyperlipidemia    IGT (impaired glucose tolerance)    Encounter for screening colonoscopy       Current Outpatient Prescriptions:     amLODIPine (NORVASC) 10 MG tablet, Take 1 tablet (10 mg total) by mouth once daily., Disp: 30 tablet, Rfl: 5    losartan-hydrochlorothiazide 50-12.5 mg (HYZAAR) 50-12.5 mg per tablet, TAKE 1 TABLET BY MOUTH DAILY, Disp: 30 tablet,  Rfl: 0    olmesartan-hydrochlorothiazide (BENICAR HCT) 40-25 mg per tablet, TAKE 1 TABLET BY MOUTH DAILY. FOLLOW-UP APPOINTMENT., Disp: 30 tablet, Rfl: 5      Previous physical therapy and treatments:  Patient has not participated in a physical therapy program to date for current referring diagnoses.    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    Swelling  No swelling noted in the UE's bilaterally    ROM of the right shoulder (initial)  Flexion  Active 90 degrees  Passive 150 degrees         Today:  Active flexion 100 degrees    Passive Flexion 160 degrees  End point of motion limited by complaints of pain    Extension WNL's  End point is uncomfortable    Abduction Active 90 degrees  Passive 90 degrees  End point of motion limited by complaints of pain    Adduction  WNL's    External rotation with shoulder at 30 degrees abduction: 45 degrees passive and active  End point is painful    Internal rotation with shoulder at 30 degrees abduction: 30 degrees passive and active  End point is painful    Patient has an acute positive impingement sign    Functional Strength Testing Right UE  Shoulder flexion 4+/5  Shoulder extension 4+/5  Shoulder adduction 5/5  Shoulder abduction 3+/5  Supraspinatus 3-/5  Infraspinatus 3-/5  Biceps 4+/5  Triceps 4+/5    Patient complained of pain with resistance to abduction, supraspinatus, and infraspinatus    Palpation  Right AC joint is tender  Tender over the insertion of the right rotator cuff  Tender in posterior deltoid  Tender over the long head of the biceps    Neuromuscular Exam    Sensation  No sensory disturbances noted throughout the LE's  Patient did not complain of paresthesias.      PROBLEM LIST - ASSESSMENT  Patient presents with limited, painful ROM of the right shoulder, an acute  positive impingement sign, and rotator cuff weakness.    Symptoms are impairing functional activities.  Patient would benefit from continuing an out-patient physical therapy program to restore ROM, improve strength, and decrease joint and soft tissue pain.    Activity Limitations, Participation Restrictions, and CO-MORBIDITIES which may impact the plan of care and potentially impede the patient's progress in therapy include:  none    The patient does not present with any learning or communication barriers which may impact the plan of care and potentially impede the patient's progress in therapy.      CLINICAL PRESENTATION:  Patient's Clinical Presentation is STABLE.      RECOMMENDED PLAN OF CARE:  Passive stretching exercises; joint mobilization to improve ROM of the right shoulder  Strengthening exercises to improve strength of the rotator cuff and scapular stabilizers    Modalities to decrease soft tissue tenderness and joint pain and stiffness      TREATMENT PROVIDED:       (one on one with therapist for therapeutic exercises 35 Mins)  -passive stretching of the right shoulder by therapist into all planes of motion  -joint mobilization of the glenohumeral and AC joints  -rotator cuff strengthening; guided resisted internal and external rotation with the shoulder abducted to 30 degrees; 3 lb dumbbell  -strengthening for the shoulder girdle musculature with 2 lb dumbbell; flexion, abduction, supraspinatus   -resisted scapular retraction on the Hydrafitness; level 3   -resisted chest press on the Hydrafitness; level 3   -upper extremity ergometer for ROM   -assisted passive stretching on pulley system - deferred    Other treatment included moist heat and electrical stimulation x 20 mins applied to the right shoulder.    Patient to continue with stretching exercises at home  --wand assisted shoulder flexion and external rotation in supine  --wand assisted shoulder extension and internal rotation in standing     She  has been instructed in the proper use of heat at home for symptomatic relief.      PLAN: Patient to attend out-patient physical therapy 2 x week to continue the Recommended Plan of Care.                                                                       SHORT TERM GOALS:    1. Patient will report compliance with daily stretching exercises  2. Patient will report 2/10 or less pain level at end point of range   3. Active shoulder elevation will improve to 140 degrees or greater    LONG TERM GOALS:  1. Full, pain free ROM of the right shoulder  2. Strength of the rotator cuff of right shoulder will improve to 4+/5  3. Patient will report a 20% or greater decrease in functional impairment      These services are reasonable and necessary for the conditions set forth above while under my care.   '

## 2018-06-11 ENCOUNTER — CLINICAL SUPPORT (OUTPATIENT)
Dept: REHABILITATION | Facility: HOSPITAL | Age: 63
End: 2018-06-11
Payer: COMMERCIAL

## 2018-06-11 DIAGNOSIS — M25.511 ACUTE PAIN OF RIGHT SHOULDER: Primary | ICD-10-CM

## 2018-06-11 PROCEDURE — 97110 THERAPEUTIC EXERCISES: CPT | Mod: PO | Performed by: PHYSICAL THERAPIST

## 2018-06-11 PROCEDURE — 97014 ELECTRIC STIMULATION THERAPY: CPT | Mod: PO | Performed by: PHYSICAL THERAPIST

## 2018-06-12 NOTE — PROGRESS NOTES
"    DATE OF INITIAL PHYSICAL THERAPY EVALUATION:             May 10, 2018      REFERRING PROVIDER:       Karine Peralta MD      REFERRING DIAGNOSIS:     Acute pain of right shoulder [M25.511]      ORDERS:   Evaluate and treat as indicated     VISIT    #7    SUBJECTIVE:  Patient states the pain and tightness in the right upper arm region with active elevation has resulted in the patient avoiding many activities.    Patients primary complaint(s):  Right shoulder pain and stiffness with active elevation of the right UE against gravity  Impaired ADL's    History of present condition provided by the patient during the initial evaluation:  Patient complains of acute right shoulder pain and stiffness which has been present for approximately one month.  She reports her discomfort began after trying to push up from a right side lying position on the couch.  She states as she began to push herself up with her right UE, feel a "tearing" sensation over the top of the shoulder which was acutely painful.   Over the next few days her right shoulder became increasingly stiff and painful and she began to experience a decrease in functional ROM.  She complains of not being able to lift her right UE beyond 90 degrees flexion or abduction due to pain at the end point throughout the glenohumeral joint.  She reports she is pain free with her right shoulder in a neutral position and at rest.  There are no complaints of radiating discomfort into the right UE.      Pain Ratin/10 at rest      4/10 at the end point of active available range    Patient reports the following functional activity limitations:  Unable to reach overhead  Lifting and carrying impaired  ADL's impaired; sweeping, making the bed, etc    (FUNCTIONAL ASSESSMENT TOOL)    THE UPPER EXTREMITY FUNCTIONAL SCALE (UEFS) - The following scores are based on patient reported assessment at the time of the initial physical therapy evaluation:    0 = extreme difficulty or " unable to perform activity; 1 = quite a bit of difficulty; 2 = moderate difficulty; 3 = a little bit of difficulty; 4 = no difficulty        Completed by patient on May 10, 07221    1 Any of your usual work, housework, or school activities;   2  2 Your usual hobbies, re creational or sporting activities;   0  3 Lifting a bag of groceries to waist level;     3  4 Lifting a bag of groceries above your head;     0  5 Grooming your hair;        2  6 Pushing up on your hands (eg from bathtub or chair);   2  7 Preparing food (eg peeling, cutting);     2  8 Driving;         2  9 Vacuuming, sweeping or raking;      1  10 Dressing;         2  11 Doing up buttons;        4  12 Using tools or appliances;       4  13 Opening doors;        3  14 Cleaning;         2  15 Tying or lacing shoes;       4  16 Sleeping;         1  17 Laundering clothes (eg washing, ironing, folding);   2  18 Opening a jar;        2  19 Throwing a ball;        1  20 Carrying a small suitcase with your affected limb;   4  SCORE: 43/80    Patient reports 54% ability on the Upper Extremity Functional Scale.   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     Vitamin D deficiency      Patient Active Problem List   Diagnosis    Annual physical exam    Acute pain of right shoulder    Screening examination for venereal disease    Essential hypertension, benign    Vitamin D deficiency    Hyperlipidemia    IGT (impaired glucose tolerance)    Encounter for screening colonoscopy       Current Outpatient Prescriptions:     amLODIPine (NORVASC) 10 MG tablet, Take 1 tablet (10 mg total) by mouth once daily., Disp: 30 tablet, Rfl: 5    losartan-hydrochlorothiazide 50-12.5 mg (HYZAAR) 50-12.5 mg per tablet, TAKE 1 TABLET BY MOUTH DAILY,  Disp: 30 tablet, Rfl: 0    olmesartan-hydrochlorothiazide (BENICAR HCT) 40-25 mg per tablet, TAKE 1 TABLET BY MOUTH DAILY. FOLLOW-UP APPOINTMENT., Disp: 30 tablet, Rfl: 5      Previous physical therapy and treatments:  Patient has not participated in a physical therapy program to date for current referring diagnoses.    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    Swelling  No swelling noted in the UE's bilaterally    ROM of the right shoulder (initial)  Flexion  Active 90 degrees  Passive 150 degrees         Today:  Active flexion 100 degrees    Passive Flexion 160 degrees  End point is painful   End point of motion limited by complaints of pain    Extension WNL's  End point is uncomfortable    Abduction Active 90 degrees  Passive 90 degrees  End point of motion limited by complaints of pain    Adduction  WNL's    External rotation with shoulder at 30 degrees abduction: 45 degrees passive and active  End point is painful    Internal rotation with shoulder at 30 degrees abduction: 30 degrees passive and active  End point is painful    Patient has an acute positive impingement sign    Functional Strength Testing Right UE  Shoulder flexion 4+/5  Shoulder extension 4+/5  Shoulder adduction 5/5  Shoulder abduction 3+/5  Supraspinatus 3-/5  Infraspinatus 3-/5  Biceps 4+/5  Triceps 4+/5    Patient complained of pain with resistance to abduction, supraspinatus, and infraspinatus    Palpation  Right AC joint is tender  Tender over the insertion of the right rotator cuff  Tender in posterior deltoid  Tender over the long head of the biceps    Neuromuscular Exam    Sensation  No sensory disturbances noted throughout the LE's  Patient did not complain of paresthesias.      PROBLEM LIST - ASSESSMENT  Patient presents with limited, painful  ROM of the right shoulder, an acute positive impingement sign, and rotator cuff weakness.  With active shoulder elevation the glenohumeral joint glides anteriorly,   Symptoms are impairing functional activities.  Patient would benefit from continuing an out-patient physical therapy program to restore ROM, improve strength, and decrease joint and soft tissue pain.    Activity Limitations, Participation Restrictions, and CO-MORBIDITIES which may impact the plan of care and potentially impede the patient's progress in therapy include:  none    The patient does not present with any learning or communication barriers which may impact the plan of care and potentially impede the patient's progress in therapy.      CLINICAL PRESENTATION:  Patient's Clinical Presentation is STABLE.      RECOMMENDED PLAN OF CARE:  Passive stretching exercises; joint mobilization to improve ROM of the right shoulder  Strengthening exercises to improve strength of the rotator cuff and scapular stabilizers    Modalities to decrease soft tissue tenderness and joint pain and stiffness      TREATMENT PROVIDED:       (one on one with therapist for therapeutic exercises 35 Mins)  -passive stretching of the right shoulder by therapist into all planes of motion  -joint mobilization of the glenohumeral and AC joints  -rotator cuff strengthening; guided resisted internal and external rotation with the shoulder abducted to 30 degrees; 3 lb dumbbell  -strengthening for the shoulder girdle musculature with 2 lb dumbbell; flexion, abduction, supraspinatus   -resisted scapular retraction on the Hydrafitness; level 3   -resisted chest press on the Hydrafitness; level 3   -upper extremity ergometer for ROM   -assisted passive stretching on pulley system - deferred    Other treatment included moist heat and electrical stimulation x 20 mins applied to the right shoulder.  Kinesiotaping applied to activiate right infraspinatus and trapezius during active shoulder  elevation to facilitate stabilization.  Patient reported less upper arm pain with active elevation with tape applied.      Patient to continue with stretching exercises at home  --wand assisted shoulder flexion and external rotation in supine  --wand assisted shoulder extension and internal rotation in standing     She has been instructed in the proper use of heat at home for symptomatic relief.      PLAN: Patient to attend out-patient physical therapy 2 x week to continue the Recommended Plan of Care.                                                                       SHORT TERM GOALS:    1. Patient will report compliance with daily stretching exercises  2. Patient will report 2/10 or less pain level at end point of range   3. Active shoulder elevation will improve to 140 degrees or greater    LONG TERM GOALS:  1. Full, pain free ROM of the right shoulder  2. Strength of the rotator cuff of right shoulder will improve to 4+/5  3. Patient will report a 20% or greater decrease in functional impairment      These services are reasonable and necessary for the conditions set forth above while under my care.   '

## 2018-06-18 ENCOUNTER — CLINICAL SUPPORT (OUTPATIENT)
Dept: REHABILITATION | Facility: HOSPITAL | Age: 63
End: 2018-06-18
Payer: COMMERCIAL

## 2018-06-18 DIAGNOSIS — M25.511 ACUTE PAIN OF RIGHT SHOULDER: Primary | ICD-10-CM

## 2018-06-18 PROCEDURE — 97035 APP MDLTY 1+ULTRASOUND EA 15: CPT | Mod: PO | Performed by: PHYSICAL THERAPIST

## 2018-06-18 PROCEDURE — 97014 ELECTRIC STIMULATION THERAPY: CPT | Mod: PO | Performed by: PHYSICAL THERAPIST

## 2018-06-18 PROCEDURE — 97110 THERAPEUTIC EXERCISES: CPT | Mod: PO | Performed by: PHYSICAL THERAPIST

## 2018-06-19 NOTE — PROGRESS NOTES
"    DATE OF INITIAL PHYSICAL THERAPY EVALUATION:             May 10, 2018      REFERRING PROVIDER:       Karine Peralta MD      REFERRING DIAGNOSIS:     Acute pain of right shoulder [M25.511]      ORDERS:   Evaluate and treat as indicated     VISIT    #8    SUBJECTIVE:  Patient states the kinesiotape decreased the pain and tightness in the right upper arm region with active elevation.      Patients primary complaint(s):  Right shoulder pain and stiffness with active elevation of the right UE against gravity  Impaired ADL's    History of present condition provided by the patient during the initial evaluation:  Patient complains of acute right shoulder pain and stiffness which has been present for approximately one month.  She reports her discomfort began after trying to push up from a right side lying position on the couch.  She states as she began to push herself up with her right UE, feel a "tearing" sensation over the top of the shoulder which was acutely painful.   Over the next few days her right shoulder became increasingly stiff and painful and she began to experience a decrease in functional ROM.  She complains of not being able to lift her right UE beyond 90 degrees flexion or abduction due to pain at the end point throughout the glenohumeral joint.  She reports she is pain free with her right shoulder in a neutral position and at rest.  There are no complaints of radiating discomfort into the right UE.      Pain Ratin/10 at rest      4/10 at the end point of active available range    Patient reports the following functional activity limitations:  Unable to reach overhead  Lifting and carrying impaired  ADL's impaired; sweeping, making the bed, etc    (FUNCTIONAL ASSESSMENT TOOL)    THE UPPER EXTREMITY FUNCTIONAL SCALE (UEFS) - The following scores are based on patient reported assessment at the time of the initial physical therapy evaluation:    0 = extreme difficulty or unable to perform " activity; 1 = quite a bit of difficulty; 2 = moderate difficulty; 3 = a little bit of difficulty; 4 = no difficulty        Completed by patient on May 10, 94442    1 Any of your usual work, housework, or school activities;   2  2 Your usual hobbies, re creational or sporting activities;   0  3 Lifting a bag of groceries to waist level;     3  4 Lifting a bag of groceries above your head;     0  5 Grooming your hair;        2  6 Pushing up on your hands (eg from bathtub or chair);   2  7 Preparing food (eg peeling, cutting);     2  8 Driving;         2  9 Vacuuming, sweeping or raking;      1  10 Dressing;         2  11 Doing up buttons;        4  12 Using tools or appliances;       4  13 Opening doors;        3  14 Cleaning;         2  15 Tying or lacing shoes;       4  16 Sleeping;         1  17 Laundering clothes (eg washing, ironing, folding);   2  18 Opening a jar;        2  19 Throwing a ball;        1  20 Carrying a small suitcase with your affected limb;   4  SCORE: 43/80    Patient reports 54% ability on the Upper Extremity Functional Scale.   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     Vitamin D deficiency      Patient Active Problem List   Diagnosis    Annual physical exam    Acute pain of right shoulder    Screening examination for venereal disease    Essential hypertension, benign    Vitamin D deficiency    Hyperlipidemia    IGT (impaired glucose tolerance)    Encounter for screening colonoscopy       Current Outpatient Prescriptions:     amLODIPine (NORVASC) 10 MG tablet, Take 1 tablet (10 mg total) by mouth once daily., Disp: 30 tablet, Rfl: 5    losartan-hydrochlorothiazide 50-12.5 mg (HYZAAR) 50-12.5 mg per tablet, TAKE 1 TABLET BY MOUTH DAILY, Disp: 30 tablet,  Rfl: 0    olmesartan-hydrochlorothiazide (BENICAR HCT) 40-25 mg per tablet, TAKE 1 TABLET BY MOUTH DAILY. FOLLOW-UP APPOINTMENT., Disp: 30 tablet, Rfl: 5      Previous physical therapy and treatments:  Patient has not participated in a physical therapy program to date for current referring diagnoses.    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    Swelling  No swelling noted in the UE's bilaterally    ROM of the right shoulder (initial)  Flexion  Active 90 degrees  Passive 150 degrees         Today:  Active flexion 100 degrees    Passive Flexion 160 degrees  End point is painful   End point of motion limited by complaints of pain    Extension WNL's  End point is uncomfortable    Abduction Active 90 degrees  Passive 90 degrees  End point of motion limited by complaints of pain    Adduction  WNL's    External rotation with shoulder at 30 degrees abduction: 45 degrees passive and active  End point is painful    Internal rotation with shoulder at 30 degrees abduction: 30 degrees passive and active  End point is painful    Patient has an acute positive impingement sign    Functional Strength Testing Right UE  Shoulder flexion 4+/5  Shoulder extension 4+/5  Shoulder adduction 5/5  Shoulder abduction 3+/5  Supraspinatus 3-/5  Infraspinatus 3-/5  Biceps 4+/5  Triceps 4+/5    Patient complained of pain with resistance to abduction, supraspinatus, and infraspinatus    Palpation  Right AC joint is tender  Tender over the insertion of the right rotator cuff  Tender in posterior deltoid  Tender over the long head of the biceps    Neuromuscular Exam    Sensation  No sensory disturbances noted throughout the LE's  Patient did not complain of paresthesias.      PROBLEM LIST - ASSESSMENT  Patient presents with limited, painful ROM of the right  shoulder, an acute positive impingement sign, and rotator cuff weakness.  With active shoulder elevation the glenohumeral joint glides anteriorly,   Symptoms are impairing functional activities.  Patient would benefit from continuing an out-patient physical therapy program to restore ROM, improve strength, and decrease joint and soft tissue pain.  Ultrasound and kinesiotaping indicated to address myofascial tenderness.      Activity Limitations, Participation Restrictions, and CO-MORBIDITIES which may impact the plan of care and potentially impede the patient's progress in therapy include:  none    The patient does not present with any learning or communication barriers which may impact the plan of care and potentially impede the patient's progress in therapy.      CLINICAL PRESENTATION:  Patient's Clinical Presentation is STABLE.      RECOMMENDED PLAN OF CARE:  Passive stretching exercises; joint mobilization to improve ROM of the right shoulder  Strengthening exercises to improve strength of the rotator cuff and scapular stabilizers    Modalities to decrease soft tissue tenderness and joint pain and stiffness      TREATMENT PROVIDED:       (one on one with therapist for therapeutic exercises 30 Mins)  -passive stretching of the right shoulder by therapist into all planes of motion  -joint mobilization of the glenohumeral and AC joints  -rotator cuff strengthening; guided resisted internal and external rotation with the shoulder abducted to 30 degrees; 3 lb dumbbell  -strengthening for the shoulder girdle musculature with 2 lb dumbbell; flexion, abduction, supraspinatus   -resisted scapular retraction on the Hydrafitness; level 3   -resisted chest press on the Hydrafitness; level 3   -upper extremity ergometer for ROM   -assisted passive stretching on pulley system - deferred    Other treatment included moist heat and electrical stimulation x 20 mins applied to the right shoulder.  Kinesiotaping to activiate right  infraspinatus and trapezius during active shoulder elevation to facilitate stabilization was deferred until next visit.  Ultrasound x 8 mins at 1.5 w/cm2, 50% duty cycle, 3 mHz to right RTC attachment.      Patient reported less upper arm pain with active elevation following the ultrasound.      Patient to continue with stretching exercises at home  --wand assisted shoulder flexion and external rotation in supine  --wand assisted shoulder extension and internal rotation in standing     She has been instructed in the proper use of heat at home for symptomatic relief.      PLAN: Patient to attend out-patient physical therapy 2 x week to continue the Recommended Plan of Care.                                                                       SHORT TERM GOALS:    1. Patient will report compliance with daily stretching exercises  2. Patient will report 2/10 or less pain level at end point of range   3. Active shoulder elevation will improve to 140 degrees or greater    LONG TERM GOALS:  1. Full, pain free ROM of the right shoulder  2. Strength of the rotator cuff of right shoulder will improve to 4+/5  3. Patient will report a 20% or greater decrease in functional impairment      These services are reasonable and necessary for the conditions set forth above while under my care.   '

## 2018-07-30 PROBLEM — Z00.00 ANNUAL PHYSICAL EXAM: Status: RESOLVED | Noted: 2018-04-25 | Resolved: 2018-07-30

## 2019-01-27 PROBLEM — J30.2 SEASONAL ALLERGIES: Status: ACTIVE | Noted: 2019-01-27

## 2019-08-05 PROBLEM — Z00.00 ANNUAL PHYSICAL EXAM: Status: RESOLVED | Noted: 2018-04-25 | Resolved: 2019-08-05

## 2022-10-10 ENCOUNTER — PATIENT MESSAGE (OUTPATIENT)
Dept: RESEARCH | Facility: HOSPITAL | Age: 67
End: 2022-10-10

## 2022-11-01 ENCOUNTER — TELEPHONE (OUTPATIENT)
Dept: ADMINISTRATIVE | Facility: HOSPITAL | Age: 67
End: 2022-11-01

## 2024-11-20 ENCOUNTER — HOSPITAL ENCOUNTER (OUTPATIENT)
Dept: PREADMISSION TESTING | Facility: HOSPITAL | Age: 69
Discharge: HOME | End: 2024-11-20
Attending: COLON & RECTAL SURGERY
Payer: COMMERCIAL

## 2024-11-20 DIAGNOSIS — Z12.11 ENCOUNTER FOR SCREENING COLONOSCOPY: ICD-10-CM
